# Patient Record
Sex: FEMALE | Race: WHITE | NOT HISPANIC OR LATINO | Employment: FULL TIME | URBAN - METROPOLITAN AREA
[De-identification: names, ages, dates, MRNs, and addresses within clinical notes are randomized per-mention and may not be internally consistent; named-entity substitution may affect disease eponyms.]

---

## 2023-06-13 ENCOUNTER — HOSPITAL ENCOUNTER (OUTPATIENT)
Dept: DATA CONVERSION | Facility: HOSPITAL | Age: 43
End: 2023-06-13
Attending: OBSTETRICS & GYNECOLOGY | Admitting: OBSTETRICS & GYNECOLOGY
Payer: COMMERCIAL

## 2023-06-13 DIAGNOSIS — J45.909 UNSPECIFIED ASTHMA, UNCOMPLICATED (HHS-HCC): ICD-10-CM

## 2023-06-13 DIAGNOSIS — F17.200 NICOTINE DEPENDENCE, UNSPECIFIED, UNCOMPLICATED: ICD-10-CM

## 2023-06-13 DIAGNOSIS — K21.9 GASTRO-ESOPHAGEAL REFLUX DISEASE WITHOUT ESOPHAGITIS: ICD-10-CM

## 2023-06-13 DIAGNOSIS — N93.9 ABNORMAL UTERINE AND VAGINAL BLEEDING, UNSPECIFIED: ICD-10-CM

## 2023-06-13 DIAGNOSIS — N93.8 OTHER SPECIFIED ABNORMAL UTERINE AND VAGINAL BLEEDING: ICD-10-CM

## 2023-06-20 LAB
COMPLETE PATHOLOGY REPORT: NORMAL
CONVERTED CLINICAL DIAGNOSIS-HISTORY: NORMAL
CONVERTED FINAL DIAGNOSIS: NORMAL
CONVERTED FINAL REPORT PDF LINK TO COPY AND PASTE: NORMAL
CONVERTED GROSS DESCRIPTION: NORMAL

## 2023-09-07 VITALS — WEIGHT: 264.55 LBS | HEIGHT: 65 IN | BODY MASS INDEX: 44.08 KG/M2

## 2023-09-30 NOTE — H&P
"    History & Physical Reviewed:   Pregnant/Lactating:  ·  Are You Pregnant no (1)   ·  Are You Currently Breastfeeding no (1)     I have reviewed the History and Physical dated:  07-Jun-2023   History and Physical reviewed and relevant findings noted. Patient examined to review pertinent physical  findings.: No significant changes   Home Medications Reviewed: no changes noted   Allergies Reviewed: no changes noted       ERAS (Enhanced Recovery After Surgery):  ·  ERAS Patient: no     Consent:   COVID-19 Consent:  ·  COVID-19 Risk Consent Surgeon has reviewed key risks related to the risk of jailene COVID-19 and if they contract COVID-19 what the risks are.       Electronic Signatures:  Gerardo Hurley ()  (Signed 13-Jun-2023 08:48)   Authored: History & Physical Reviewed, ERAS, Consent,  Note Completion      Last Updated: 13-Jun-2023 08:48 by Gerardo Hurley ()    References:  1.  Data Referenced From \"Patient Profile - Preop v3\" 12-Jun-2023 12:04   "

## 2023-10-02 NOTE — OP NOTE
PROCEDURE DETAILS    Preoperative Diagnosis:  Other specified abnormal uterine and vaginal bleeding, N93.8    Postoperative Diagnosis:  Other specified abnormal uterine and vaginal bleeding, N93.8    Surgeon: Gerardo Hurley  Resident/Fellow/Other Assistant: Antionette Alvarez    Procedure:  1. HYSTEROSCOPY/ D & C WITH ENDOMETRIAL ABLATION    Anesthesia: No anesthesiologist associated with this case  Estimated Blood Loss: Less than 100 cc  Findings: See dictation  Specimens(s) Collected: yes,  Endometrial curettings   Complications: None    Date of Dictation: 13-Jun-2023  Dictated By: Xavi                            Attestation:   Note Completion:  Attending Attestation I performed the procedure without a resident         Electronic Signatures:  Gerardo Hurley ()  (Signed 13-Jun-2023 09:51)   Authored: Post-Operative Note, Chart Review, Note Completion      Last Updated: 13-Jun-2023 09:51 by Gerardo Hurley ()

## 2024-03-26 ENCOUNTER — HOSPITAL ENCOUNTER (OUTPATIENT)
Dept: RADIOLOGY | Facility: CLINIC | Age: 44
Discharge: HOME | End: 2024-03-26
Payer: COMMERCIAL

## 2024-03-26 ENCOUNTER — OFFICE VISIT (OUTPATIENT)
Dept: ORTHOPEDIC SURGERY | Facility: CLINIC | Age: 44
End: 2024-03-26
Payer: COMMERCIAL

## 2024-03-26 DIAGNOSIS — M79.672 LEFT FOOT PAIN: ICD-10-CM

## 2024-03-26 DIAGNOSIS — M76.62 ACHILLES TENDINITIS OF LEFT LOWER EXTREMITY: Primary | ICD-10-CM

## 2024-03-26 DIAGNOSIS — M76.62 ACHILLES TENDINITIS OF LEFT LOWER EXTREMITY: ICD-10-CM

## 2024-03-26 PROCEDURE — 73630 X-RAY EXAM OF FOOT: CPT | Mod: LT

## 2024-03-26 PROCEDURE — 3008F BODY MASS INDEX DOCD: CPT | Performed by: ORTHOPAEDIC SURGERY

## 2024-03-26 PROCEDURE — 99205 OFFICE O/P NEW HI 60 MIN: CPT | Performed by: ORTHOPAEDIC SURGERY

## 2024-03-26 PROCEDURE — 73630 X-RAY EXAM OF FOOT: CPT | Mod: LEFT SIDE | Performed by: RADIOLOGY

## 2024-03-26 PROCEDURE — 99215 OFFICE O/P EST HI 40 MIN: CPT | Performed by: ORTHOPAEDIC SURGERY

## 2024-03-26 NOTE — PROGRESS NOTES
This 43-year-old woman was seen with her  for longstanding complicated left Achilles tendinitis.  The patient's been seen by a podiatrist since May for atraumatic pain in the posterior ankle area.  She has had multiple courses of prednisone.  None of which relieved her symptoms.  She is also been in a boot and that has not relieved her symptoms.    Patient notes throbbing pain that radiates from her calcaneus up to her mid calf.  She notes it gets worse throughout the day particularly with walking or stair climbing.    The patient's had no stretching physical therapy or any injections.  She is currently off the prednisone.  Patient has been taking Advil.    Patient denies any neurologic symptoms in the lower extremity or any previous similar problems.    Further complicating the patient's situation is she is a cigarette smoker and has a BMI greater than 40.    Review of systems:  A complete review of the patient's past medical history and review of 30 systems and all medications and allergies was performed. Please see adult medical record for details.    A Family history for genetic or familial inheritance issues and Social history including smoking history, alcohol consumption and exercise activities was also reviewed and significant findings noted in the patients history of present illness.    Physical Exam:  The patient is well-nourished and well-developed and in no acute distress. The patient displayed normal mood and affect. The patient's pupils were equal, round sclerae are white. Patient's respirations appear to be regular and unlabored.    Examination of the patient's left foot revealed the following. The patient's gait and stance revealed mild pes planus and pronation.  There did not appear to be any skin abnormalities or lymphangitis. There was moderate swelling noted over the Achilles tendon insertion at the calcaneus and slight erythema.  There was no deformity.    There was marked tenderness to  "palpation along the Achilles tendon at the insertion at the calcaneus.  The Achilles tendon was palpable throughout its length and intact.  Ankle range of motion was full although restricted somewhat in dorsiflexion due to tightness of the Achilles tendon.  The Harris test was negative for Achilles tendon rupture.  Subtalar motion was full and midtarsal motion was full. The Silfverskiold test was positive.   The neurovascular examination was intact. The neurological exam including motor and sensory exam was performed. The vascular examination including palpation of pulses and capillary refill of the foot was performed and determined to be intact.    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  MRI dated February 10, 2024 reveals \"moderate to severe diffuse Achilles tendinosis starting in the noninsertional watershed zone 2.5 cm above the tibial plafond and extending to the tendon insertion to the calcaneus.  The tendon is enlarged measuring 11 mm greatest AP.  Intra tendon all mucoid degeneration.  Fluid-filled tear in the anterior portion of the tendon measuring 2.1 cm transverse by 2 cm craniocaudal by 3 mm AP involving up to 40 to 50% of the tendon thickness in cross-sectional diameter particularly near the tendon insertion.\"    Impression & Plan:   It is my impression this patient has severe tendinosis and insertional Achilles tendinitis of her left Achilles tendon.    At this point with her severe morbid obesity and cigarette smoking she would be at high risk for complications with surgical intervention.    I have prescribed physical therapy for stretching exercises and instructed the patient in daily stretching exercises.  I provided her with a brochure describing the stretches.  I explained to the patient and her  " that she is at significant risk for Achilles tendon rupture needs to be careful with her activities.    We discussed proper eating habits and the importance of weight loss and cigarette smoking cessation.  If she needs surgery she needs to work on decreasing her risk of complications.    If the patient's not significantly improved after stretching for 8 to 10 weeks I would like to see her back for reexam and consideration of surgical intervention.      Note dictated with [employer] software.  Completed without full type editing and sent to avoid delay.

## 2024-03-26 NOTE — LETTER
March 26, 2024     Woo Minor MD  [address]  [facility] [address]    Patient: Urvashi Austin   YOB: 1980   Date of Visit: 3/26/2024       Dear Dr. Woo Minor MD:    Thank you for referring Urvashi Austin to me for evaluation. Below are my notes for this consultation.  If you have questions, please do not hesitate to call me. I look forward to following your patient along with you.       Sincerely,     Herman Thompson MD      CC: No Recipients  ______________________________________________________________________________________    This 43-year-old woman was seen with her  for longstanding complicated left Achilles tendinitis.  The patient's been seen by a podiatrist since May for atraumatic pain in the posterior ankle area.  She has had multiple courses of prednisone.  None of which relieved her symptoms.  She is also been in a boot and that has not relieved her symptoms.    Patient notes throbbing pain that radiates from her calcaneus up to her mid calf.  She notes it gets worse throughout the day particularly with walking or stair climbing.    The patient's had no stretching physical therapy or any injections.  She is currently off the prednisone.  Patient has been taking Advil.    Patient denies any neurologic symptoms in the lower extremity or any previous similar problems.    Further complicating the patient's situation is she is a cigarette smoker and has a BMI greater than 40.    Review of systems:  A complete review of the patient's past medical history and review of 30 systems and all medications and allergies was performed. Please see adult medical record for details.    A Family history for genetic or familial inheritance issues and Social history including smoking history, alcohol consumption and exercise activities was also reviewed and significant findings noted in the patients history of present illness.    Physical Exam:  The patient is well-nourished  "and well-developed and in no acute distress. The patient displayed normal mood and affect. The patient's pupils were equal, round sclerae are white. Patient's respirations appear to be regular and unlabored.    Examination of the patient's left foot revealed the following. The patient's gait and stance revealed mild pes planus and pronation.  There did not appear to be any skin abnormalities or lymphangitis. There was moderate swelling noted over the Achilles tendon insertion at the calcaneus and slight erythema.  There was no deformity.    There was marked tenderness to palpation along the Achilles tendon at the insertion at the calcaneus.  The Achilles tendon was palpable throughout its length and intact.  Ankle range of motion was full although restricted somewhat in dorsiflexion due to tightness of the Achilles tendon.  The Harris test was negative for Achilles tendon rupture.  Subtalar motion was full and midtarsal motion was full. The Silfverskiold test was positive.   The neurovascular examination was intact. The neurological exam including motor and sensory exam was performed. The vascular examination including palpation of pulses and capillary refill of the foot was performed and determined to be intact.    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  MRI dated February 10, 2024 reveals \"moderate to severe diffuse Achilles tendinosis starting in the noninsertional watershed zone 2.5 cm above the tibial plafond and extending to the tendon insertion to the calcaneus.  The tendon is enlarged measuring 11 mm greatest AP.  Intra tendon all mucoid degeneration.  Fluid-filled tear in the anterior portion of the tendon measuring 2.1 cm transverse by 2 cm craniocaudal by 3 mm AP involving up to 40 to 50% of the tendon thickness in " "cross-sectional diameter particularly near the tendon insertion.\"    Impression & Plan:   It is my impression this patient has severe tendinosis and insertional Achilles tendinitis of her left Achilles tendon.    At this point with her severe morbid obesity and cigarette smoking she would be at high risk for complications with surgical intervention.    I have prescribed physical therapy for stretching exercises and instructed the patient in daily stretching exercises.  I provided her with a brochure describing the stretches.  I explained to the patient and her  that she is at significant risk for Achilles tendon rupture needs to be careful with her activities.    We discussed proper eating habits and the importance of weight loss and cigarette smoking cessation.  If she needs surgery she needs to work on decreasing her risk of complications.    If the patient's not significantly improved after stretching for 8 to 10 weeks I would like to see her back for reexam and consideration of surgical intervention.      Note dictated with [employer] software.  Completed without full type editing and sent to avoid delay.  "

## 2024-04-12 ENCOUNTER — EVALUATION (OUTPATIENT)
Dept: PHYSICAL THERAPY | Facility: CLINIC | Age: 44
End: 2024-04-12
Payer: COMMERCIAL

## 2024-04-12 DIAGNOSIS — M76.62 ACHILLES TENDINITIS OF LEFT LOWER EXTREMITY: ICD-10-CM

## 2024-04-12 PROCEDURE — 97110 THERAPEUTIC EXERCISES: CPT | Mod: GP | Performed by: PHYSICAL THERAPIST

## 2024-04-12 PROCEDURE — 97161 PT EVAL LOW COMPLEX 20 MIN: CPT | Mod: GP | Performed by: PHYSICAL THERAPIST

## 2024-04-12 ASSESSMENT — ENCOUNTER SYMPTOMS
OCCASIONAL FEELINGS OF UNSTEADINESS: 0
LOSS OF SENSATION IN FEET: 0
DEPRESSION: 0

## 2024-04-12 NOTE — PROGRESS NOTES
Physical Therapy       Patient Name: Urvashi Austin  MRN: [id number]  Today's Date: 4/12/2024    Time Calculation  Start Time: 0905  Stop Time: 0940  Time Calculation (min): 35 min  PT Evaluation Time Entry  PT Evaluation (Low) Time Entry: 20  PT Therapeutic Procedures Time Entry  Therapeutic Exercise Time Entry: 15                 Diagnosis:  Left Achilles  Referred  by:  Dr. Herman Thompson MD    INSURANCE $35.00 copay   Visit 1   Visit Limits: 20   Cert Dates: No auth needed   Rechecks:    Eval PT: LK     Precautions, PMH Fall Risk Supervision   Eye surgery 2018, c section 2002, 2015 NONE NONE   HA, smoker +            SUBJECTIVE  HPI/DOI/SONIA:  long chronic hx of ankle pain, insidious onset  PAIN / LOCATION / DESCRIPTION:  left posterior calcaneal region  PAIN AGGRAVATING FACTORS:  walking, standing iadls  PAIN ALLEVIATING FACTORS:  rest  COUGH AND SNEEZE:  neg  FUNCTIONAL LIMITATIONS:  prolonged walking  CURRENT MEDICAL MANAGEMENT:    -Xrays:  bone spur post heel  -MRI:  see chart  -MEDICATIONS:  given boot and meds  PLOF:  over a year ago w/o pain  PATIENT THERAPY GOALS:  stop the pain    OBJECTIVE - ANKLE  Posture:  left leg longer, shoe ware heavier on left;  B pronation = needs arch support for shoes  Gait:  Patient demonstrates 250 feet x 2 with decreased ade, step length, push off, heel strike; increased yvette, antalgic  Steps:  wfl but antalgic  Transfers:  Sit to stand  Balance:  wfl on level surfaces    Arom: (supine) in degrees°   Ankle DF R/L:  12/8     PF R/L:   40/40    INV R/L:  30/32   Ev R/L:   10/9     Great toe ext R/L:  70/67    Manual Muscle Testing (supine)  ?/5  Ankle DF (L4) R/L: 5/5  Ankle PF (S1) R/L: 5/5  Ankle Inv R/L: 5/5  Ankle Ev R/L 5/5    Special Testing  Instability:  +/-  Anterior Drawer R/L: -/-  Lateral talar Tilt Stress Test R/L: - b  Medial Talar Tilt Stress Test R/L: - b  Syndesmotic Ankle Sprain:  +/-  Squeeze Test R/L:   - b    Flexibility Deficits  Gastroc R/L:   mod/mod  soleus R/L:   mod/mod    Joint Mobility  T/C minimal restrictions noted anterior and posterior  Calcaneus:  minimal restrictions     ASSESSMENT  Patient is a 43 year old with a diagnosis of left achilles tendonitis.    Patient demonstrated pronated ankle positioning w/ left leg longer as well that became even  from supine to sitting.     In addition to needing arch supportive shoe ware, she will require strengthening of pelvis/L hip to improve ankle function in closed kinetic chain.   Modalities, including ionto, for bone spur region and manual to t/c and calcaneus to improve joint mobility and function in supination.   Initiated HEP of stretches and strengthening.   Perform cane pelvis self iso mobs to improve her LLD discrepancy.    Patient has KT tape and is to bring in next time so we can teach her to tape for achilles tendonitis.  Patient would benefit from PT 1-2 x a week for 6 weeks to address problem list and impairments noted below and return to highest level of function.   Patient tolerated treatment well without increased pain or complaints following treatment.     Patient verbal agreed with plan of care.      Ankle phase 1     [id number]  CALF STRETCH WITH TOWEL -  Repeat 5 Repetitions, Hold 30 Seconds, Complete 1 Set, Perform 2 Times a Day  CALF STRETCH WITH TOWEL - SOLEUS -  Repeat 5 Repetitions, Hold 30 Seconds, Complete 1 Set, Perform 2 Times a Day  SINGLE KNEE TO CHEST STRETCH - SKTC -  Repeat 5 Repetitions, Hold 30 Seconds, Complete 1 Set, Perform 2 Times a Day  SUPINE HIP ABDUCTION  - ISOMETRIC -  Repeat 10 Repetitions, Hold 10 Seconds, Complete 1 Set, Perform 2 Times a Day  HIP ADDUCTION PILLOW SQUEEZE - SUPINE HOOK LYING -  Repeat 10 Repetitions, Hold 10 Seconds, Complete 1 Set, Perform 2 Times a Day  HIP ABDUCTION - SIDELYING -  Repeat 10 Repetitions, Hold 2 Seconds, Complete 2 Sets, Perform 2 Times a Day  SIDE LYING CLAMSHELL - CLAM SHELL -  Repeat 10 Repetitions, Hold 2 Seconds,  Complete 2 Sets, Perform 2 Times a Day    Clinical Presentation:  Stable and/or uncomplicated characteristics  Level Of Complexity:  Low    Rehab Potential:  Good    Problem List:  Pain  Deficits of ROM, strength, stability  Functional deficits  Posture, Body Mechanics deficits  HEP  Shoe ware non-supportive for arches and t/c foot positioning.      Response to interventions:   Patient tolerated treatment well without increased pain or complaints following treatment.  Skin intact following treatment.  No questions regarding HEP per patient report.     Progress toward functional goals:   Initiated goals and poc this date.    GOALS: includes patient and therapist collaboration and stated goals:  Patient to be seen 2 x a week for 6 weeks:  ST weeks:   Patient independent with home exercise program.  LT-8 weeks (unless otherwise noted below):  1.  Patient to report reduction of pain by 50-60% at minimum in order to improve ckc tolerances.  2.  Patient to demonstrate an improvement in arom to reach gait and step goals.  3.  Patient to demonstrate increase in strength by 1 mmt grade in order to reach gait and step goals.  4.  Patient able to walk on level surfaces w/ LRD or no AD (if appropriate) x 1000 feet, I or MI, with appropriate gait pattern.  5.  Patient able to ascend/descend 12 steps x 1 rep with one HR, I or MI (LRD), reciprocal pattern with appropriate technique for safety.  6.  Patient to demonstrate improvement in balance to good on level surfaces during gait activities performed in clinic with I or MI, no AD or LRD.  7.  Patient to demonstrate improvement in functional outcome form to reach gait and step goals.     Justification for skilled care:   Patient will require skilled PT care 1-2 x a week for 6 weeks in order to assess and modify hep / clinical program in order to reach goals and achieve functional mobility / PLOF .       Education and Treatment Interventions performed this date:   TX  rationale, HEP, safety education, fall prevention education as appropriate.  Pt. continues to be educated on HEP, Anatomy and function, Dx, TX findings, POC, goals of therapy, activity modification, proper posture and body mechanics. HEP continues to be reviewed with pt. for any questions, concerns, modifications needed and progressions.  All questions and concerns were addressed during tx.  Patient demonstrated verbal confirmation of understanding of education provided.          PLAN OF CARE TO INCLUDE the following possible treatment interventions:   Cryotherapy, Edema Control, Education/Instruction, Gait Training, Home Program Development, Manual therapy, Neuromuscular Re-education, Therapeutic Activity, Therapeutic Exercise,  Balance Training; manual, IDN, [place] and/or Electrical Stimulation (PRN if not contraindicated).  Frequency & Duration:  Patient is appropriate for skilled care PT 1-2 x per week for 4-6 weeks in order to reduce impairments identified in objective and assessment section and improve functional mobility tolerances to return patient to highest level of PLOF.  Plan of Care Agreement:   Patient participated in and is agreeable to the POC.

## 2024-04-24 ENCOUNTER — TREATMENT (OUTPATIENT)
Dept: PHYSICAL THERAPY | Facility: CLINIC | Age: 44
End: 2024-04-24
Payer: COMMERCIAL

## 2024-04-24 DIAGNOSIS — M76.62 ACHILLES TENDINITIS OF LEFT LOWER EXTREMITY: Primary | ICD-10-CM

## 2024-04-24 PROCEDURE — 97033 APP MDLTY 1+IONTPHRSIS EA 15: CPT | Mod: GP,CQ

## 2024-04-24 PROCEDURE — 97110 THERAPEUTIC EXERCISES: CPT | Mod: GP,CQ

## 2024-04-24 PROCEDURE — 97140 MANUAL THERAPY 1/> REGIONS: CPT | Mod: GP,CQ

## 2024-04-24 NOTE — PROGRESS NOTES
Physical Therapy       Patient Name: Urvashi Austin  MRN: [id number]  Today's Date: 4/24/2024    Time Calculation  Start Time: 0830  Stop Time: 0930  Time Calculation (min): 60 min     PT Therapeutic Procedures Time Entry  Manual Therapy Time Entry: 15  Therapeutic Exercise Time Entry: 15  PT Modalities Time Entry  Hot/Cold Pack Time Entry: 5  Iontophoresis Time Entry: 25              Diagnosis:  Left Achilles  Referred  by:  Dr. Herman Thompson MD    INSURANCE $35.00 copay   Visit 2   Visit Limits: 20   Cert Dates: No auth needed   Rechecks:    Eval PT: LK     Precautions, PMH Fall Risk Supervision   Eye surgery 2018, c section 2002, 2015 NONE NONE   HA, smoker +            SUBJECTIVE  Pt states that her achilles is sensitive to touch, but feels better if she wraps it in tape. Pt reports 6-7/10.   HPI/DOI/SONIA:  long chronic hx of ankle pain, insidious onset  PAIN / LOCATION / DESCRIPTION:  left posterior calcaneal region  PAIN AGGRAVATING FACTORS:  walking, standing iadls  PAIN ALLEVIATING FACTORS:  rest  COUGH AND SNEEZE:  neg  FUNCTIONAL LIMITATIONS:  prolonged walking  CURRENT MEDICAL MANAGEMENT:    -Xrays:  bone spur post heel  -MRI:  see chart  -MEDICATIONS:  given boot and meds  PLOF:  over a year ago w/o pain  PATIENT THERAPY GOALS:  stop the pain    Therapeutic treatment  Therapeutic exercise: 15 minutes  Qubii x 10 minutes  Standing calf stretch:  2 x 30 sec (straight knee & bent knee)  Seated calf stretch w/ strap 2 x 30 sec hold    Manual: 15 minutes  Stm of gastroc region and achilles  Tpr of gastroc  Kinesiology tape support to L achilles  Skin intact following    Modalities: 30 minutes, 2 units  Iontophoresis x 20 minutes + 5 minutes (for set-up and skin check) (dosage: 1.2 mA) to L distal achilles at insertion site  Cp x 5 minutes, L achilles, prone  Skin intact following    Ankle phase 1     ZCNAMNQ  CALF STRETCH WITH TOWEL -  Repeat 5 Repetitions, Hold 30 Seconds, Complete 1 Set, Perform 2 Times  a Day  CALF STRETCH WITH TOWEL - SOLEUS -  Repeat 5 Repetitions, Hold 30 Seconds, Complete 1 Set, Perform 2 Times a Day  SINGLE KNEE TO CHEST STRETCH - SKTC -  Repeat 5 Repetitions, Hold 30 Seconds, Complete 1 Set, Perform 2 Times a Day  SUPINE HIP ABDUCTION  - ISOMETRIC -  Repeat 10 Repetitions, Hold 10 Seconds, Complete 1 Set, Perform 2 Times a Day  HIP ADDUCTION PILLOW SQUEEZE - SUPINE HOOK LYING -  Repeat 10 Repetitions, Hold 10 Seconds, Complete 1 Set, Perform 2 Times a Day  HIP ABDUCTION - SIDELYING -  Repeat 10 Repetitions, Hold 2 Seconds, Complete 2 Sets, Perform 2 Times a Day  SIDE LYING CLAMSHELL - CLAM SHELL -  Repeat 10 Repetitions, Hold 2 Seconds, Complete 2 Sets, Perform 2 Times a Day    Clinical Presentation:  Stable and/or uncomplicated characteristics  Level Of Complexity:  Low    Rehab Potential:  Good    Problem List:  Pain  Deficits of ROM, strength, stability  Functional deficits  Posture, Body Mechanics deficits  HEP  Shoe ware non-supportive for arches and t/c foot positioning.      ASSESSMENT  Response to interventions:  Pt displays good tolerance of warm-up on peddle bike and stretching. Pt displays trigger points throughout L medial gastroc region w/ c/o tenderness. Educated pt about importance of proper footwear and pt was given letter for employer to wear tennis shoes to work. Pt reports pain reduction to 2/10 following inotophoresis and achilles taping. Skin intact following treatment session.      Progress toward functional goals:   Initiated goals and poc this date.    GOALS: includes patient and therapist collaboration and stated goals:  Patient to be seen 2 x a week for 6 weeks:  ST weeks:   Patient independent with home exercise program.  LT-8 weeks (unless otherwise noted below):  1.  Patient to report reduction of pain by 50-60% at minimum in order to improve ckc tolerances.  2.  Patient to demonstrate an improvement in arom to reach gait and step goals.  3.  Patient to  demonstrate increase in strength by 1 mmt grade in order to reach gait and step goals.  4.  Patient able to walk on level surfaces w/ LRD or no AD (if appropriate) x 1000 feet, I or MI, with appropriate gait pattern.  5.  Patient able to ascend/descend 12 steps x 1 rep with one HR, I or MI (LRD), reciprocal pattern with appropriate technique for safety.  6.  Patient to demonstrate improvement in balance to good on level surfaces during gait activities performed in clinic with I or MI, no AD or LRD.  7.  Patient to demonstrate improvement in functional outcome form to reach gait and step goals.     Justification for skilled care:   Patient will require skilled PT care 1-2 x a week for 6 weeks in order to assess and modify hep / clinical program in order to reach goals and achieve functional mobility / PLOF .       Education and Treatment Interventions performed this date:   TX rationale, HEP, safety education, fall prevention education as appropriate.  Pt. continues to be educated on HEP, Anatomy and function, Dx, TX findings, POC, goals of therapy, activity modification, proper posture and body mechanics. HEP continues to be reviewed with pt. for any questions, concerns, modifications needed and progressions.  All questions and concerns were addressed during tx.  Patient demonstrated verbal confirmation of understanding of education provided.          PLAN:  Cryotherapy, Edema Control, Education/Instruction, Gait Training, Home Program Development, Manual therapy, Neuromuscular Re-education, Therapeutic Activity, Therapeutic Exercise,  Balance Training; manual, IDN, [place] and/or Electrical Stimulation (PRN if not contraindicated).

## 2024-04-26 ENCOUNTER — APPOINTMENT (OUTPATIENT)
Dept: PHYSICAL THERAPY | Facility: CLINIC | Age: 44
End: 2024-04-26
Payer: COMMERCIAL

## 2024-05-01 ENCOUNTER — TREATMENT (OUTPATIENT)
Dept: PHYSICAL THERAPY | Facility: CLINIC | Age: 44
End: 2024-05-01
Payer: COMMERCIAL

## 2024-05-01 DIAGNOSIS — M76.62 ACHILLES TENDINITIS OF LEFT LOWER EXTREMITY: Primary | ICD-10-CM

## 2024-05-01 PROCEDURE — 97110 THERAPEUTIC EXERCISES: CPT | Mod: GP | Performed by: PHYSICAL THERAPIST

## 2024-05-01 PROCEDURE — 97014 ELECTRIC STIMULATION THERAPY: CPT | Mod: GP | Performed by: PHYSICAL THERAPIST

## 2024-05-01 NOTE — PROGRESS NOTES
Physical Therapy       Patient Name: Urvashi Austin  MRN: [id number]  Today's Date: 5/1/2024    Time Calculation  Start Time: 0830  Stop Time: 0915  Time Calculation (min): 45 min     PT Therapeutic Procedures Time Entry  Manual Therapy Time Entry: 10  Therapeutic Exercise Time Entry: 25  PT Modalities Time Entry  E-Stim (Unattended) Time Entry: 10                Diagnosis:  Left Achilles  Referred  by:  Dr. Herman Thompson MD    INSURANCE $30.00 copay   Visit 3   Visit Limits: 20   Cert Dates: No auth needed   Rechecks:    Eval PT: LK     Precautions, PMH Fall Risk Supervision   Eye surgery 2018, c section 2002, 2015 NONE NONE   HA, smoker +            SUBJECTIVE  Pt reports 3/10 today and noted the estim helped tremendously.  HPI/DOI/SONIA:  long chronic hx of ankle pain, insidious onset  PAIN / LOCATION / DESCRIPTION:  left posterior calcaneal region  FUNCTIONAL LIMITATIONS:  prolonged walking  PATIENT THERAPY GOALS:  stop the pain  HEP:  compliant w/ hep and has no questions      OBJECTIVE  Arom: (supine) in degrees°   Ankle DF R/L:  12/8     PF R/L:   40/40    INV R/L:  30/32   Ev R/L:   10/9     Great toe ext R/L:  70/67      Therapeutic treatment  Therapeutic exercise:   Nustep:  L4 x 10 min  Standing calf stretch:  2 x 30 sec (straight knee & bent knee)  1/2 foam roll calf raises x 10 slow eccentric load  Seated calf stretch w/ strap 2 x 30 sec hold  S/l abduction 3 x 10   2#  S/l er 3 x 10  2#    Manual:   Stm of gastroc region and achilles  Tpr of gastroc  Kinesiology tape support to L achilles  Skin intact following    Modalities:   Iontophoresis x 20 minutes + 5 minutes (for set-up and skin check) (dosage: 1.2 mA) to L distal achilles at insertion site  Cp x 5 minutes, L achilles, prone  Skin intact following    HEP#1    ZCNAMNQ  CALF STRETCH WITH TOWEL   CALF STRETCH WITH TOWEL - SOLEUS   SINGLE KNEE TO CHEST STRETCH - SKTC   SUPINE HIP ABDUCTION  - ISOMETRIC   HIP ADDUCTION PILLOW SQUEEZE - SUPINE HOOK  LYING   HIP ABDUCTION - SIDELYING   SIDE LYING CLAMSHELL - CLAM SHELL    HEP#2:  [id number]  Achilles stretch on step -  Repeat 6 Repetitions, Hold 3 Seconds, Complete 1 Set, Perform 3 Times a Day    ASSESSMENT  Response to interventions:  Pt displays good tolerance of warm-up on peddle bike and stretching. Pt displays trigger points throughout L medial gastroc region w/ c/o tenderness. Educated pt about importance of proper footwear and pt was given letter for employer to wear tennis shoes to work. Pt reports pain reduction to 2/10 following inotophoresis and achilles taping. Skin intact following treatment session.      Progress toward functional goals:   Focusing on pain reduction and functional tolerances to walking.    GOALS: includes patient and therapist collaboration and stated goals:  Patient to be seen 2 x a week for 6 weeks:  ST weeks:   Patient independent with home exercise program.  LT-8 weeks (unless otherwise noted below):  1.  Patient to report reduction of pain by 50-60% at minimum in order to improve ckc tolerances.  2.  Patient to demonstrate an improvement in arom to reach gait and step goals.  3.  Patient to demonstrate increase in strength by 1 mmt grade in order to reach gait and step goals.  4.  Patient able to walk on level surfaces w/ LRD or no AD (if appropriate) x 1000 feet, I or MI, with appropriate gait pattern.  5.  Patient able to ascend/descend 12 steps x 1 rep with one HR, I or MI (LRD), reciprocal pattern with appropriate technique for safety.  6.  Patient to demonstrate improvement in balance to good on level surfaces during gait activities performed in clinic with I or MI, no AD or LRD.  7.  Patient to demonstrate improvement in functional outcome form to reach gait and step goals.     Justification for skilled care:   Patient will require skilled PT care 1-2 x a week for 6 weeks in order to assess and modify hep / clinical program in order to reach goals and achieve  functional mobility / PLOF .       Education and Treatment Interventions performed this date:   TX rationale, HEP, safety education, fall prevention education as appropriate.  Pt. continues to be educated on HEP, Anatomy and function, Dx, TX findings, POC, goals of therapy, activity modification, proper posture and body mechanics. HEP continues to be reviewed with pt. for any questions, concerns, modifications needed and progressions.  All questions and concerns were addressed during tx.  Patient demonstrated verbal confirmation of understanding of education provided.          PLAN:    Patient is a 43 year old with a diagnosis of left achilles tendonitis.    Patient demonstrated pronated ankle positioning w/ left leg longer that became even from supine to sitting.     In addition to needing arch supportive shoe ware, she will require strengthening of pelvis/L hip to improve ankle function in closed kinetic chain.   Modalities, including ionto, for bone spur region and manual to t/c and calcaneus to improve joint mobility and function in supination.    Perform cane pelvis self iso mobs to improve her LLD discrepancy.    Patient has KT tape and is to bring in next time so we can teach her to tape for achilles tendonitis.  Patient would benefit from PT 1-2 x a week for 6 weeks to address problem list and impairments noted below and return to highest level of function.     Patient verbal agreed with plan of care.

## 2024-05-03 ENCOUNTER — APPOINTMENT (OUTPATIENT)
Dept: PHYSICAL THERAPY | Facility: CLINIC | Age: 44
End: 2024-05-03
Payer: COMMERCIAL

## 2024-05-06 NOTE — OP NOTE
SURGEON:  Gerardo Hurley DO    PREOPERATIVE DIAGNOSIS:    Recurrent dysfunctional uterine bleeding.    POSTOPERATIVE DIAGNOSIS:    Recurrent dysfunctional uterine bleeding.    PROCEDURE:    Dilatation, curettage, hysteroscopy with NovaSure ablation.     All risks, benefits, complications, and alternatives of surgery  explained to the patient in detail prior to arriving in the operating  room today.  All her questions were answered to her satisfaction.  Appropriate permits signed.     DESCRIPTION OF PROCEDURE:    The patient was taken to the operating room, prepped and draped in  the usual sterile manner, placed in dorsal lithotomy position under  general anesthesia.  Pelvic examination showed uterus to be in the  midline, freely mobile.  No palpable adnexal masses were appreciated.     Using a hysteroscope, the external cervical os was entered.  The  scope was advanced up the cervical canal into the uterine cavity.  Once inside the uterus, the walls were symmetrical.  Both tubal ostia  were identified.  Photo documentation was obtained.  Gentle sharp  curettage yielded a moderate amount of tissue sent for histologic  study.     The NovaSure device was then created.  Uterus was sounded to 10 cm  and a width of 4.5 cm.  Cavity assessment passed.  The device was  enabled.  NovaSure ablation was then carried out to a point of  automatic shut off.  Recheck of the uterine cavity showed good marly  present.     Blood loss was less than 100 mL.  No complications were encountered.  Sponge counts were correct.  The patient having tolerated the  procedure and anesthesia well and was taken to the recovery room in  stable condition.       Gerardo Hurley DO    DD:  06/13/2023 09:43:55 EST  DT:  06/13/2023 10:28:53 EST  DICTATION NUMBER:  [id number]  INTERNAL JOB NUMBER:  [id number]             Electronic Signatures:  Gerardo Hurley) (Signed on 26-Jun-2023 09:29)   Authored  Unsigned, Draft (SYS GENERATED)  (Entered on 13-Jun-2023 10:28)   Entered    Last Updated: 26-Jun-2023 09:29 by Gerardo Hurley (DO)

## 2024-05-08 ENCOUNTER — TREATMENT (OUTPATIENT)
Dept: PHYSICAL THERAPY | Facility: CLINIC | Age: 44
End: 2024-05-08
Payer: COMMERCIAL

## 2024-05-08 DIAGNOSIS — M76.62 ACHILLES TENDINITIS OF LEFT LOWER EXTREMITY: Primary | ICD-10-CM

## 2024-05-08 PROCEDURE — 97014 ELECTRIC STIMULATION THERAPY: CPT | Mod: GP | Performed by: PHYSICAL THERAPIST

## 2024-05-08 PROCEDURE — 97110 THERAPEUTIC EXERCISES: CPT | Mod: GP | Performed by: PHYSICAL THERAPIST

## 2024-05-08 NOTE — PROGRESS NOTES
Physical Therapy       Patient Name: Urvashi Austin  MRN: [id number]  Today's Date: 5/8/2024    Time Calculation  Start Time: 0830  Stop Time: 0915  Time Calculation (min): 45 min     PT Therapeutic Procedures Time Entry  Therapeutic Exercise Time Entry: 30  PT Modalities Time Entry  E-Stim (Unattended) Time Entry: 15                Diagnosis:  Left Achilles  Referred  by:  Dr. Herman Thompson MD    INSURANCE $30.00 copay   Visit 4   Visit Limits: 20   Cert Dates: No auth needed   Rechecks:    Eval PT: LK     Precautions, PMH Fall Risk Supervision   Eye surgery 2018, c section 2002, 2015 NONE NONE   HA, smoker +            SUBJECTIVE  Pt reports 0/10 today and noted the estim helped tremendously.  HPI/DOI/SONIA:  long chronic hx of ankle pain, insidious onset  PAIN / LOCATION / DESCRIPTION:  left posterior calcaneal region  FUNCTIONAL LIMITATIONS:  prolonged walking  PATIENT THERAPY GOALS:  stop the pain  HEP:  compliant w/ hep and has no questions      OBJECTIVE  Arom: (supine) in degrees°   Ankle DF R/L:  12/8     PF R/L:   40/40    INV R/L:  30/32   Ev R/L:   10/9     Great toe ext R/L:  70/67      Therapeutic treatment  Therapeutic exercise:   Nustep:  L4 x 10 min  Standing calf stretch:  2 x 30 sec (straight knee & bent knee)  1/2 foam roll calf raises x 10 slow eccentric load  Step dips to the carson and return:  x 20 each leg  SLS (L):  swing right leg forwards/backwards, side to side x 30   BAPS:  (L) leg; L4:  DF/PF;  INV/SANTANA;  circles cw/ccw x 30  Seated long sitting:  TB blue all planes x 20 each plane on (L) leg.  Seated calf stretch w/ strap 2 x 30 sec hold  S/l abduction 3 x 10   2#  S/l er 3 x 10  2#    Manual:   Stm of gastroc region and achilles  Tpr of gastroc  Kinesiology tape support to L achilles  Skin intact following    Modalities:   Iontophoresis x 20 minutes + 5 minutes (for set-up and skin check) (dosage: 1.2 mA) to L distal achilles at insertion site  Cp x 5 minutes, L achilles, prone  PRN  Skin intact following      HEP#1    ZCNAMNQ  CALF STRETCH WITH TOWEL   CALF STRETCH WITH TOWEL - SOLEUS   SINGLE KNEE TO CHEST STRETCH - SKTC   SUPINE HIP ABDUCTION  - ISOMETRIC   HIP ADDUCTION PILLOW SQUEEZE - SUPINE HOOK LYING   HIP ABDUCTION - SIDELYING   SIDE LYING CLAMSHELL - CLAM SHELL    HEP#2:  [id number]  Achilles stretch on step -  Repeat 6 Repetitions, Hold 3 Seconds, Complete 1 Set, Perform 3 Times a Day    HEP#3:  calf stretch standing, gentle stretch of step;   heel raises w/ eccentric lowering (L)  3 x a day.    ASSESSMENT  Response to interventions:  Pt displays good tolerance of warm-up on peddle bike and stretching. Pt displays trigger points throughout L medial gastroc region w/ c/o tenderness. Educated pt about importance of proper footwear and pt was given letter for employer to wear tennis shoes to work.   Patient tolerated treatment well without increased pain or complaints following treatment.     Skin intact following treatment.     Progress toward functional goals:   Focusing on pain reduction and functional tolerances to walking.    GOALS: includes patient and therapist collaboration and stated goals:  Patient to be seen 2 x a week for 6 weeks:  ST weeks:   Patient independent with home exercise program.  LT-8 weeks (unless otherwise noted below):  1.  Patient to report reduction of pain by 50-60% at minimum in order to improve ckc tolerances.  2.  Patient to demonstrate an improvement in arom to reach gait and step goals.  3.  Patient to demonstrate increase in strength by 1 mmt grade in order to reach gait and step goals.  4.  Patient able to walk on level surfaces w/ LRD or no AD (if appropriate) x 1000 feet, I or MI, with appropriate gait pattern.  5.  Patient able to ascend/descend 12 steps x 1 rep with one HR, I or MI (LRD), reciprocal pattern with appropriate technique for safety.  6.  Patient to demonstrate improvement in balance to good on level surfaces during gait  activities performed in clinic with I or MI, no AD or LRD.  7.  Patient to demonstrate improvement in functional outcome form to reach gait and step goals.     Justification for skilled care:   Patient will require skilled PT care 1-2 x a week for 6 weeks in order to assess and modify hep / clinical program in order to reach goals and achieve functional mobility / PLOF .       Education and Treatment Interventions performed this date:   TX rationale, HEP, safety education, fall prevention education as appropriate.  Pt. continues to be educated on HEP, Anatomy and function, Dx, TX findings, POC, goals of therapy, activity modification, proper posture and body mechanics. HEP continues to be reviewed with pt. for any questions, concerns, modifications needed and progressions.  All questions and concerns were addressed during tx.  Patient demonstrated verbal confirmation of understanding of education provided.          PLAN:    Patient is a 43 year old with a diagnosis of left achilles tendonitis.    Patient demonstrated pronated ankle positioning w/ left leg longer that became even from supine to sitting.     In addition to needing arch supportive shoe ware, she will require strengthening of pelvis/L hip to improve ankle function in closed kinetic chain.   Modalities, including ionto, for bone spur region and manual to t/c and calcaneus to improve joint mobility and function in supination.    Perform cane pelvis self iso mobs to improve her LLD discrepancy.    Patient has KT tape and is to bring in next time so we can teach her to tape for achilles tendonitis.  Patient would benefit from PT 1-2 x a week for 6 weeks to address problem list and impairments noted below and return to highest level of function.     Patient verbal agreed with plan of care.

## 2024-05-14 NOTE — PROGRESS NOTES
Physical Therapy       Patient Name: Urvashi Austin  MRN: [id number]  Today's Date: 5/15/2024    Time Calculation  Start Time: 0835  Stop Time: 0915  Time Calculation (min): 40 min     PT Therapeutic Procedures Time Entry  Therapeutic Exercise Time Entry: 40                   Diagnosis:  Left Achilles  Referred  by:  Dr. Herman Thompson MD    INSURANCE $30.00 copay   Visit 4   Visit Limits: 20   Cert Dates: No auth needed   Rechecks: 05/15/   Eval PT: LK     Precautions, PMH Fall Risk Supervision   Eye surgery 2018, c section 2002, 2015 NONE NONE   HA, smoker +            SUBJECTIVE  Pt reports 03/10 today and notes she did not always use her shoe when doing laundry and steps the last two days..  PAIN / LOCATION / DESCRIPTION:  left posterior calcaneal region  FUNCTIONAL LIMITATIONS:  prolonged walking  PATIENT THERAPY GOALS:  stop the pain  HEP:  compliant w/ hep and has no questions   noted the estim helped tremendously.      OBJECTIVE  Posture:  left leg longer, shoe ware heavier on left;  B pronation = needs arch support for shoes     Arom: (supine) in degrees°   Ankle DF R/L:  12/10     PF R/L:   40/40    INV R/L:  30/32   Ev R/L:   10/11    Great toe ext R/L:  70/70     Flexibility Deficits  Gastroc R/L:  mod/mod  soleus R/L:   mod/mod     Joint Mobility  T/C minimal restrictions noted anterior and posterior  Calcaneus:  minimal restrictions        Therapeutic treatment  Nustep:  L4 x 10 min  Standing calf stretch:  2 x 30 sec (straight knee & bent knee)  1/2 foam roll B calf raises w/ L  slow eccentric load (unilateral) 3 x 15 w/ rest periods prn  Step dips to the floor and return:  x 20 each leg  SLS (L):  swing right leg forwards/backwards, side to side x 30   BAPS:  (L) leg; L4:  DF/PF;  INV/SANTANA;  circles cw/ccw x 30  Seated long sitting:  TB blue all planes x 20 each plane on (L) leg.  Seated calf stretch w/ strap 2 x 30 sec hold  S/l abduction 3 x 10   2#  S/l er 3 x 10  2#    Manual:   Stm of gastroc  region and achilles  Tpr of gastroc  Kinesiology tape support to L achilles  Skin intact following    Modalities:   Ice bath 30 sec in/30 sec out  Skin intact following treatment.      ASSESSMENT  Response to interventions:  Patient is 4 visits in therapy as of this RA.  She demonstrates 30-40% improvement in pain levels thus far.   She notes better tolerances to walking in the stores where prior to PT she could not walk through stores at all.   She is making progress toward therapy goals...see goals for specifics.   Pt displays good tolerance of warm-up on peddle bike and stretching. oughout L medial gastroc region w/ c/o tenderness. Educated pt about importance of proper footwear and pt was given letter for employer to wear tennis shoes to work.   Patient tolerated treatment well without increased pain or complaints following treatment.      Skin intact following treatment.      ST weeks:   Patient independent with home exercise program.   GOAL MET  LT-8 weeks (unless otherwise noted below):  1.  Patient to report reduction of pain by 50-60% at minimum in order to improve ckc tolerances.  30-40% CHANGE per patient  2.  Patient to demonstrate an improvement in arom to reach gait and step goals.    Goal in progress  3.  Patient to demonstrate increase in strength by 1 mmt grade in order to reach gait and step goals.  Goal in progress  4.  Patient able to walk on level surfaces w/ LRD or no AD (if appropriate) x 1000 feet, I or MI, with appropriate gait pattern.  Goal in progress - reports she went to [facility] and was not in pain as much post walking  5.  Patient able to ascend/descend 12 steps x 1 rep with one HR, I or MI (LRD), reciprocal pattern with appropriate technique for safety.  Goal in progress  6.  Patient to demonstrate improvement in balance to good on level surfaces during gait activities performed in clinic with I or MI, no AD or LRD.  Goal in progress  7.  Patient to demonstrate improvement  in functional outcome form to reach gait and step goals.  51/80 on RA        Progress toward functional goals:   Focusing on pain reduction and functional tolerances to walking.    GOALS: includes patient and therapist collaboration and stated goals:  Patient to be seen 2 x a week for 6 weeks:  ST weeks:   Patient independent with home exercise program.  LT-8 weeks (unless otherwise noted below):  1.  Patient to report reduction of pain by 50-60% at minimum in order to improve ckc tolerances.  2.  Patient to demonstrate an improvement in arom to reach gait and step goals.  3.  Patient to demonstrate increase in strength by 1 mmt grade in order to reach gait and step goals.  4.  Patient able to walk on level surfaces w/ LRD or no AD (if appropriate) x 1000 feet, I or MI, with appropriate gait pattern.  5.  Patient able to ascend/descend 12 steps x 1 rep with one HR, I or MI (LRD), reciprocal pattern with appropriate technique for safety.  6.  Patient to demonstrate improvement in balance to good on level surfaces during gait activities performed in clinic with I or MI, no AD or LRD.  7.  Patient to demonstrate improvement in functional outcome form to reach gait and step goals.     Justification for skilled care:   Patient will require skilled PT care 1-2 x a week for 6 weeks in order to assess and modify hep / clinical program in order to reach goals and achieve functional mobility / PLOF .       Education and Treatment Interventions performed this date:   TX rationale, HEP, safety education, fall prevention education as appropriate.  Pt. continues to be educated on HEP, Anatomy and function, Dx, TX findings, POC, goals of therapy, activity modification, proper posture and body mechanics. HEP continues to be reviewed with pt. for any questions, concerns, modifications needed and progressions.  All questions and concerns were addressed during tx.  Patient demonstrated verbal confirmation of understanding of  education provided.       HEP#1    [id number]  CALF STRETCH WITH TOWEL   CALF STRETCH WITH TOWEL - SOLEUS   SINGLE KNEE TO CHEST STRETCH - SKTC   SUPINE HIP ABDUCTION  - ISOMETRIC   HIP ADDUCTION PILLOW SQUEEZE - SUPINE HOOK LYING   HIP ABDUCTION - SIDELYING   SIDE LYING CLAMSHELL - CLAM SHELL    HEP#2:  [id number]  Achilles stretch on step -  Repeat 6 Repetitions, Hold 3 Seconds, Complete 1 Set, Perform 3 Times a Day    HEP#3:  calf stretch standing, gentle stretch of step;   heel raises w/ eccentric lowering (L)  3 x a day.       PLAN:    Patient is a 43 year old with a diagnosis of left achilles tendonitis.    Patient demonstrated pronated ankle positioning w/ left leg longer that became even from supine to sitting.     In addition to needing arch supportive shoe ware, she will require strengthening of pelvis/L hip to improve ankle function in closed kinetic chain.   Modalities, including ionto, for bone spur region and manual to t/c and calcaneus to improve joint mobility and function in supination.    Perform cane pelvis self iso mobs to improve her LLD discrepancy.   Patient would benefit from continued  PT 1-2 x a week for 6 weeks to address problem list of pain, decreased rom, strength, tolerances to prolonged walking and stepping and return to highest level of function.     Patient verbal agreed with plan of care.

## 2024-05-15 ENCOUNTER — TREATMENT (OUTPATIENT)
Dept: PHYSICAL THERAPY | Facility: CLINIC | Age: 44
End: 2024-05-15
Payer: COMMERCIAL

## 2024-05-15 DIAGNOSIS — M76.62 ACHILLES TENDINITIS OF LEFT LOWER EXTREMITY: Primary | ICD-10-CM

## 2024-05-15 PROCEDURE — 97110 THERAPEUTIC EXERCISES: CPT | Mod: GP | Performed by: PHYSICAL THERAPIST

## 2024-05-29 ENCOUNTER — APPOINTMENT (OUTPATIENT)
Dept: PHYSICAL THERAPY | Facility: CLINIC | Age: 44
End: 2024-05-29
Payer: COMMERCIAL

## 2024-06-05 ENCOUNTER — APPOINTMENT (OUTPATIENT)
Dept: PHYSICAL THERAPY | Facility: CLINIC | Age: 44
End: 2024-06-05
Payer: COMMERCIAL

## 2024-06-11 ENCOUNTER — APPOINTMENT (OUTPATIENT)
Dept: PHYSICAL THERAPY | Facility: CLINIC | Age: 44
End: 2024-06-11
Payer: COMMERCIAL

## 2024-06-12 ENCOUNTER — APPOINTMENT (OUTPATIENT)
Dept: PHYSICAL THERAPY | Facility: CLINIC | Age: 44
End: 2024-06-12
Payer: COMMERCIAL

## 2024-06-18 ENCOUNTER — APPOINTMENT (OUTPATIENT)
Dept: PHYSICAL THERAPY | Facility: CLINIC | Age: 44
End: 2024-06-18
Payer: COMMERCIAL

## 2024-06-19 ENCOUNTER — APPOINTMENT (OUTPATIENT)
Dept: PHYSICAL THERAPY | Facility: CLINIC | Age: 44
End: 2024-06-19
Payer: COMMERCIAL

## 2024-06-26 ENCOUNTER — APPOINTMENT (OUTPATIENT)
Dept: PHYSICAL THERAPY | Facility: CLINIC | Age: 44
End: 2024-06-26
Payer: COMMERCIAL

## 2024-07-03 ENCOUNTER — APPOINTMENT (OUTPATIENT)
Dept: PHYSICAL THERAPY | Facility: CLINIC | Age: 44
End: 2024-07-03
Payer: COMMERCIAL

## 2024-07-10 ENCOUNTER — APPOINTMENT (OUTPATIENT)
Dept: PHYSICAL THERAPY | Facility: CLINIC | Age: 44
End: 2024-07-10
Payer: COMMERCIAL

## 2024-07-17 ENCOUNTER — APPOINTMENT (OUTPATIENT)
Dept: PHYSICAL THERAPY | Facility: CLINIC | Age: 44
End: 2024-07-17
Payer: COMMERCIAL

## 2024-07-25 ENCOUNTER — OFFICE VISIT (OUTPATIENT)
Dept: PRIMARY CARE | Facility: CLINIC | Age: 44
End: 2024-07-25
Payer: COMMERCIAL

## 2024-07-25 VITALS
BODY MASS INDEX: 44.89 KG/M2 | WEIGHT: 269.4 LBS | HEIGHT: 65 IN | OXYGEN SATURATION: 96 % | SYSTOLIC BLOOD PRESSURE: 133 MMHG | HEART RATE: 82 BPM | RESPIRATION RATE: 18 BRPM | DIASTOLIC BLOOD PRESSURE: 88 MMHG

## 2024-07-25 DIAGNOSIS — Z12.31 VISIT FOR SCREENING MAMMOGRAM: ICD-10-CM

## 2024-07-25 DIAGNOSIS — M54.50 ACUTE RIGHT-SIDED LOW BACK PAIN WITHOUT SCIATICA: ICD-10-CM

## 2024-07-25 DIAGNOSIS — Z00.00 ANNUAL PHYSICAL EXAM: Primary | ICD-10-CM

## 2024-07-25 DIAGNOSIS — M76.62 ACHILLES TENDINITIS OF LEFT LOWER EXTREMITY: ICD-10-CM

## 2024-07-25 DIAGNOSIS — Z12.11 COLON CANCER SCREENING: ICD-10-CM

## 2024-07-25 PROBLEM — E11.9 TYPE 2 DIABETES MELLITUS WITHOUT COMPLICATION, WITHOUT LONG-TERM CURRENT USE OF INSULIN (MULTI): Status: ACTIVE | Noted: 2024-07-25

## 2024-07-25 PROCEDURE — 99396 PREV VISIT EST AGE 40-64: CPT | Performed by: FAMILY MEDICINE

## 2024-07-25 PROCEDURE — 3008F BODY MASS INDEX DOCD: CPT | Performed by: FAMILY MEDICINE

## 2024-07-25 RX ORDER — PREDNISONE 20 MG/1
40 TABLET ORAL DAILY
Qty: 14 TABLET | Refills: 0 | Status: SHIPPED | OUTPATIENT
Start: 2024-07-25 | End: 2024-08-01

## 2024-07-25 ASSESSMENT — PATIENT HEALTH QUESTIONNAIRE - PHQ9
1. LITTLE INTEREST OR PLEASURE IN DOING THINGS: NOT AT ALL
2. FEELING DOWN, DEPRESSED OR HOPELESS: NOT AT ALL
SUM OF ALL RESPONSES TO PHQ9 QUESTIONS 1 AND 2: 0

## 2024-07-25 NOTE — PROGRESS NOTES
"  Subjective     Patient ID: Urvashi Austin is a 44 y.o. female who presents for Annual Exam (ANNUAL PHYSICAL).      Last Physical : 1 Years ago     Pt's PMH, PSH, SH, FH , meds and allergies was obtained / reviewed and updated .     Dental visits : Y  Vision issues : N  Hearing issues : N    Immunizations : Y    Diet :  could be better  Exercise:none  Weight concerns : yes    Alcohol: as noted in SH  Tobacco: as noted in SH  Recreational drug use : None/ as noted in SH    Sexually active : Active   Contraception : none  Menstrual problems:V no periods since ablation.   Premenopausal/perimenopausal/ postmenopausal:      Parity:2  Full term:    Premature:   (s):   Living :  Ab induced:   Ab spontaneous :  Ectopic :   Multiple :    PAP smear :UTD  Mammogram :due  Colonoscopy:due     Metabolic screening   - Lipid   - Glucose    Has had seevre pain in the achilles tendon  ======================================================    Visit Vitals  /88   Pulse 82   Resp 18   Ht 1.651 m (5' 5\")   Wt 122 kg (269 lb 6.4 oz)   SpO2 96%   BMI 44.83 kg/m²   OB Status Ablation   Smoking Status Every Day   BSA 2.37 m²      No LMP recorded. Patient has had an ablation.     =====================================    Review of systems:  Constitutional: no chills, no fever and no night sweats.     Eyes: no blurred vision and no eyesight problems.     ENT: no hearing loss, no nasal congestion, no nasal discharge, no hoarseness and no sore throat.     Cardiovascular: no chest pain, no intermittent leg claudication, no lower extremity edema, no palpitations and no syncope.     Respiratory: no cough, no shortness of breath during exertion, no shortness of breath at rest and no wheezing.     Gastrointestinal: no abdominal pain, no blood in stools, no constipation, no diarrhea, no melena, no nausea, no rectal pain and no vomiting.     Genitourinary: no dysuria, no change in urinary frequency, no urinary hesitancy, no feelings " of urinary urgency and no vaginal discharge.     Musculoskeletal: no arthralgias, no back pain and no myalgias.     Integumentary: no new skin lesions and no rashes.     Neurological: no difficulty walking, no headache, no limb weakness, no numbness and no tingling.     Psychiatric: no anxiety, no depression, no anhedonia and no substance use disorders.     Endocrine: no recent weight gain and no recent weight loss.     Hematologic/Lymphatic: no tendency for easy bruising and no swollen glands.   ============================================================    Physical exam :    Constitutional: Alert and in no acute distress. Well developed, well nourished.     Eyes: Normal external exam. Pupils were equal in size, round, reactive to light (PERRL) with normal accommodation and extraocular movements intact (EOMI).     Ears, Nose, Mouth, and Throat: External inspection of ears and nose: Normal.  Otoscopic examination: Normal.      Neck: No neck mass was observed. Supple.     Cardiovascular: Heart rate and rhythm were normal, normal S1 and S2, no gallops, no murmurs and no pericardial rub    Pulmonary: No respiratory distress. Clear bilateral breath sounds.     Abdomen: Soft nontender; no abdominal mass palpated. No organomegaly.     Musculoskeletal: No joint swelling seen, normal movements of all extremities. Range of motion: Normal.  Muscle strength/tone: Normal.      Skin: Normal skin color and pigmentation, normal skin turgor, and no rash.     Neurologic: Deep tendon reflexes were 2+ and symmetric. Sensation: Normal.     Psychiatric: Judgment and insight: Intact. Mood and affect: Normal.    Lymphatic : Cervical/ axillary/ groin Lns Palpable/ non palpable            All other systems have been reviewed and are negative for complaint.      Assessment/Plan    Problem List Items Addressed This Visit             ICD-10-CM    Achilles tendinitis of left lower extremity M76.62    Relevant Medications    nitroglycerin  (Nitrodur) 0.3 mg/hr patch    Annual physical exam - Primary Z00.00    Relevant Orders    CBC    Comprehensive Metabolic Panel    Lipid Panel    TSH with reflex to Free T4 if abnormal    Vitamin D 25-Hydroxy,Total (for eval of Vitamin D levels)    Visit for screening mammogram Z12.31    Relevant Orders    BI mammo bilateral screening tomosynthesis    Colon cancer screening Z12.11    Relevant Orders    Colonoscopy Screening; Average Risk Patient    Acute right-sided low back pain without sciatica M54.50    Relevant Medications    predniSONE (Deltasone) 20 mg tablet

## 2024-07-27 PROBLEM — M54.50 ACUTE RIGHT-SIDED LOW BACK PAIN WITHOUT SCIATICA: Status: ACTIVE | Noted: 2024-07-27

## 2024-07-27 PROBLEM — E11.9 TYPE 2 DIABETES MELLITUS WITHOUT COMPLICATION, WITHOUT LONG-TERM CURRENT USE OF INSULIN (MULTI): Status: RESOLVED | Noted: 2024-07-25 | Resolved: 2024-07-27

## 2024-08-01 ENCOUNTER — APPOINTMENT (OUTPATIENT)
Dept: RADIOLOGY | Facility: CLINIC | Age: 44
End: 2024-08-01
Payer: COMMERCIAL

## 2024-08-07 ENCOUNTER — TELEPHONE (OUTPATIENT)
Dept: PRIMARY CARE | Facility: CLINIC | Age: 44
End: 2024-08-07
Payer: COMMERCIAL

## 2024-10-17 ENCOUNTER — OFFICE VISIT (OUTPATIENT)
Dept: ORTHOPEDIC SURGERY | Facility: CLINIC | Age: 44
End: 2024-10-17
Payer: COMMERCIAL

## 2024-10-17 DIAGNOSIS — M76.62 ACHILLES TENDINITIS OF LEFT LOWER EXTREMITY: Primary | ICD-10-CM

## 2024-10-17 PROCEDURE — 99213 OFFICE O/P EST LOW 20 MIN: CPT | Performed by: ORTHOPAEDIC SURGERY

## 2024-10-17 NOTE — PROGRESS NOTES
This 43-year-old woman returns today noting marked improvement in her Achilles tendinitis.  She has days when she is pain-free and other days when she has some pain and swelling.  She tells me she is doing her stretching on a daily basis and is finished physical therapy.    Review of systems:  A complete review of the patient's past medical history and review of 30 systems and all medications and allergies was performed. Please see adult medical record for details.    A Family history for genetic or familial inheritance issues and Social history including smoking history, alcohol consumption and exercise activities was also reviewed and significant findings noted in the patients history of present illness.    Physical Exam:  The patient is well-nourished and well-developed and in no acute distress. The patient displayed normal mood and affect. The patient's pupils were equal, round sclerae are white. Patient's respirations appear to be regular and unlabored.    Examination of the patient's right foot revealed the following. There did not appear to be any skin abnormalities or lymphangitis. There was minimal swelling noted over the Achilles tendon insertion at the calcaneus and slight erythema.    There was mild tenderness to palpation along the Achilles tendon at the insertion at the calcaneus.  The Achilles tendon was palpable throughout its length and intact.  Ankle range of motion was full although restricted somewhat in dorsiflexion due to tightness of the Achilles tendon.  The Harris test was negative for Achilles tendon rupture.  Subtalar motion was full and midtarsal motion was full.  The Silfverskiold test was mildly positive and improved from previous exam.  The neurovascular examination was intact. The neurological exam including motor and sensory exam was performed. The vascular examination including palpation of pulses and capillary refill of the foot was performed and determined to be  intact.    Impression & Plan:   It is my impression this patient is insertional Achilles tendinitis appears to be improving with stretching and physical therapy.  I explained to the patient the only other option if it does not resolve and she still having a great deal of symptoms would be surgical intervention.  With her cigarette smoking and BMI greater than 44 she would have increased risk for complications with surgical intervention.  Because of her improvement I would recommend at this time continued stretching on a daily basis which we have again reviewed.    I explained to the patient I will be retiring at the end of this year and I have referred her to Dr. Sanju Moseley one of her foot and ankle specialists for further problems.  I also provided the patient with a note that she should be allowed to wear tennis shoes at work because of her Achilles tendinitis.      Note dictated with [employer] software.  Completed without full type editing and sent to avoid delay.

## 2024-10-17 NOTE — LETTER
October 17, 2024     Woo Minor MD  [address]  [facility] [address]  [facility] [address]    Patient: Urvashi Austin   YOB: 1980   Date of Visit: 10/17/2024       Dear Dr. Woo Minor MD:    Thank you for referring Urvashi Austin to me for evaluation. Below are my notes for this consultation.  If you have questions, please do not hesitate to call me. I look forward to following your patient along with you.       Sincerely,     Herman Thompson MD      CC: No Recipients  ______________________________________________________________________________________    This 43-year-old woman returns today noting marked improvement in her Achilles tendinitis.  She has days when she is pain-free and other days when she has some pain and swelling.  She tells me she is doing her stretching on a daily basis and is finished physical therapy.    Review of systems:  A complete review of the patient's past medical history and review of 30 systems and all medications and allergies was performed. Please see adult medical record for details.    A Family history for genetic or familial inheritance issues and Social history including smoking history, alcohol consumption and exercise activities was also reviewed and significant findings noted in the patients history of present illness.    Physical Exam:  The patient is well-nourished and well-developed and in no acute distress. The patient displayed normal mood and affect. The patient's pupils were equal, round sclerae are white. Patient's respirations appear to be regular and unlabored.    Examination of the patient's right foot revealed the following. There did not appear to be any skin abnormalities or lymphangitis. There was minimal swelling noted over the Achilles tendon insertion at the calcaneus and slight erythema.    There was mild tenderness to palpation along the Achilles tendon at the insertion at the calcaneus.  The Achilles tendon was palpable throughout its  length and intact.  Ankle range of motion was full although restricted somewhat in dorsiflexion due to tightness of the Achilles tendon.  The Harris test was negative for Achilles tendon rupture.  Subtalar motion was full and midtarsal motion was full.  The Silfverskiold test was mildly positive and improved from previous exam.  The neurovascular examination was intact. The neurological exam including motor and sensory exam was performed. The vascular examination including palpation of pulses and capillary refill of the foot was performed and determined to be intact.    Impression & Plan:   It is my impression this patient is insertional Achilles tendinitis appears to be improving with stretching and physical therapy.  I explained to the patient the only other option if it does not resolve and she still having a great deal of symptoms would be surgical intervention.  With her cigarette smoking and BMI greater than 44 she would have increased risk for complications with surgical intervention.  Because of her improvement I would recommend at this time continued stretching on a daily basis which we have again reviewed.    I explained to the patient I will be retiring at the end of this year and I have referred her to Dr. Sanju Moseley one of her foot and ankle specialists for further problems.  I also provided the patient with a note that she should be allowed to wear tennis shoes at work because of her Achilles tendinitis.      Note dictated with [employer] software.  Completed without full type editing and sent to avoid delay.